# Patient Record
Sex: FEMALE | Race: WHITE
[De-identification: names, ages, dates, MRNs, and addresses within clinical notes are randomized per-mention and may not be internally consistent; named-entity substitution may affect disease eponyms.]

---

## 2017-03-09 ENCOUNTER — HOSPITAL ENCOUNTER (OUTPATIENT)
Dept: HOSPITAL 61 - PCVCCLINIC | Age: 82
Discharge: HOME | End: 2017-03-09
Attending: INTERNAL MEDICINE
Payer: COMMERCIAL

## 2017-03-09 DIAGNOSIS — I34.0: ICD-10-CM

## 2017-03-09 DIAGNOSIS — I07.1: ICD-10-CM

## 2017-03-09 DIAGNOSIS — I25.10: Primary | ICD-10-CM

## 2017-03-09 DIAGNOSIS — I10: ICD-10-CM

## 2017-03-09 DIAGNOSIS — E78.00: ICD-10-CM

## 2017-03-09 DIAGNOSIS — I25.5: ICD-10-CM

## 2017-03-09 DIAGNOSIS — I48.0: ICD-10-CM

## 2017-03-09 PROCEDURE — G0463 HOSPITAL OUTPT CLINIC VISIT: HCPCS

## 2017-03-09 PROCEDURE — 93005 ELECTROCARDIOGRAM TRACING: CPT

## 2017-03-28 ENCOUNTER — HOSPITAL ENCOUNTER (EMERGENCY)
Dept: HOSPITAL 35 - ER | Age: 82
Discharge: HOME | End: 2017-03-28
Payer: COMMERCIAL

## 2017-03-28 VITALS — WEIGHT: 170 LBS | BODY MASS INDEX: 34.27 KG/M2 | HEIGHT: 59 IN

## 2017-03-28 DIAGNOSIS — I10: ICD-10-CM

## 2017-03-28 DIAGNOSIS — R10.84: ICD-10-CM

## 2017-03-28 DIAGNOSIS — M41.9: ICD-10-CM

## 2017-03-28 DIAGNOSIS — R51: ICD-10-CM

## 2017-03-28 DIAGNOSIS — M54.30: ICD-10-CM

## 2017-03-28 DIAGNOSIS — R19.7: Primary | ICD-10-CM

## 2017-03-28 DIAGNOSIS — Z95.1: ICD-10-CM

## 2017-03-28 DIAGNOSIS — Z90.89: ICD-10-CM

## 2017-03-28 DIAGNOSIS — Z90.710: ICD-10-CM

## 2017-03-28 DIAGNOSIS — E78.5: ICD-10-CM

## 2017-03-28 DIAGNOSIS — Z95.0: ICD-10-CM

## 2017-03-28 DIAGNOSIS — Z86.12: ICD-10-CM

## 2017-03-28 DIAGNOSIS — M19.90: ICD-10-CM

## 2017-03-28 LAB
ANION GAP SERPL CALC-SCNC: 9 MMOL/L (ref 7–16)
ANISOCYTOSIS BLD QL SMEAR: (no result)
BASOPHILS NFR BLD AUTO: 3 % (ref 0–2)
BUN SERPL-MCNC: 13 MG/DL (ref 7–18)
BURR CELLS BLD QL SMEAR: (no result)
CALCIUM SERPL-MCNC: 9.1 MG/DL (ref 8.5–10.1)
CHLORIDE SERPL-SCNC: 105 MMOL/L (ref 98–107)
CO2 SERPL-SCNC: 26 MMOL/L (ref 21–32)
CREAT SERPL-MCNC: 0.7 MG/DL (ref 0.6–1.3)
EOSINOPHIL NFR BLD: 6 % (ref 0–3)
ERYTHROCYTE [DISTWIDTH] IN BLOOD BY AUTOMATED COUNT: 19.6 % (ref 10.5–14.5)
GLUCOSE SERPL-MCNC: 107 MG/DL (ref 70–99)
GRANULOCYTES NFR BLD MANUAL: 57 % (ref 36–66)
HCT VFR BLD CALC: 37.4 % (ref 37–47)
HGB BLD-MCNC: 12.7 GM/DL (ref 12–15)
LG PLATELETS BLD QL SMEAR: (no result)
LYMPHOCYTES NFR BLD AUTO: 17 % (ref 24–44)
MANUAL DIFFERENTIAL PERFORMED BLD QL: YES
MCH RBC QN AUTO: 31.8 PG (ref 26–34)
MCHC RBC AUTO-ENTMCNC: 33.9 G/DL (ref 28–37)
MCV RBC: 93.6 FL (ref 80–100)
MONOCYTES NFR BLD: 16 % (ref 1–8)
NEUTROPHILS # BLD: 3 THOU/UL (ref 1.4–8.2)
NEUTS BAND NFR BLD: 1 % (ref 0–8)
PLATELET # BLD: 268 THOU/UL (ref 150–400)
POIKILOCYTOSIS BLD QL SMEAR: SLIGHT
POTASSIUM SERPL-SCNC: 3.3 MMOL/L (ref 3.5–5.1)
RBC # BLD AUTO: 4 MIL/UL (ref 4.2–5)
SODIUM SERPL-SCNC: 140 MMOL/L (ref 136–145)
TOTAL CELL COUNT: 100
TROPONIN I SERPL-MCNC: < 0.04 NG/ML
WBC # BLD AUTO: 5.1 THOU/UL (ref 4–11)

## 2017-09-18 ENCOUNTER — HOSPITAL ENCOUNTER (OUTPATIENT)
Dept: HOSPITAL 61 - PCVCCLINIC | Age: 82
Discharge: HOME | End: 2017-09-18
Attending: INTERNAL MEDICINE
Payer: COMMERCIAL

## 2017-09-18 ENCOUNTER — HOSPITAL ENCOUNTER (OUTPATIENT)
Dept: HOSPITAL 61 - PCVCIMAG | Age: 82
Discharge: HOME | End: 2017-09-18
Attending: INTERNAL MEDICINE
Payer: COMMERCIAL

## 2017-09-18 DIAGNOSIS — E78.00: ICD-10-CM

## 2017-09-18 DIAGNOSIS — I48.0: ICD-10-CM

## 2017-09-18 DIAGNOSIS — I34.0: ICD-10-CM

## 2017-09-18 DIAGNOSIS — Z95.810: ICD-10-CM

## 2017-09-18 DIAGNOSIS — I07.1: Primary | ICD-10-CM

## 2017-09-18 DIAGNOSIS — I10: ICD-10-CM

## 2017-09-18 DIAGNOSIS — Z88.8: ICD-10-CM

## 2017-09-18 DIAGNOSIS — I25.810: Primary | ICD-10-CM

## 2017-09-18 DIAGNOSIS — Z79.899: ICD-10-CM

## 2017-09-18 DIAGNOSIS — Z95.1: ICD-10-CM

## 2017-09-18 DIAGNOSIS — I25.5: ICD-10-CM

## 2017-09-18 DIAGNOSIS — I36.1: ICD-10-CM

## 2017-09-18 DIAGNOSIS — M19.90: ICD-10-CM

## 2017-09-18 DIAGNOSIS — I25.810: ICD-10-CM

## 2017-09-18 PROCEDURE — 80061 LIPID PANEL: CPT

## 2017-09-18 PROCEDURE — 93283 PRGRMG EVAL IMPLANTABLE DFB: CPT

## 2017-09-18 PROCEDURE — G0463 HOSPITAL OUTPT CLINIC VISIT: HCPCS

## 2017-09-18 PROCEDURE — 93284 PRGRMG EVAL IMPLANTABLE DFB: CPT

## 2017-09-18 PROCEDURE — 93306 TTE W/DOPPLER COMPLETE: CPT

## 2017-09-18 NOTE — PCVCIMAG
--------------- APPROVED REPORT --------------





Study performed:  2017 13:42:43



EXAM: Comprehensive 2D, Doppler, and color-flow 

Echocardiogram

Patient Location: Echo lab

Status:  routine



BSA:         1.68

HR: 70 bpmBP:          120/72 mmHg

Rhythm: Pacemaker



Other Information 

Study Quality: Adequate

Technically limited study due to  body habitus.



Indications

Atrial Fibrillation

Pacemaker

CAD

CABG, ISCH CM



2D Dimensions

LVEF(%):  40.87 (&gt;50%)

IVSd:  9.77 (7-11mm)

LVDd:  41.07 mm

PWd:  9.76 (7-11mm)

LVDs:  32.98 (25-40mm)

Left Atrium:  43.70 (27-40mm)

Aortic Root:  34.60 mm

LV Single Plane 4CH:  48.72 %

LV Single Plane 2CH:  42.74 %Saunders's LVEF:  45.73 %

Biplane EF:  45.6 %



Volumes

Left Atrial Volume (Systole)

Single Plane 4CH:  116.63 mLSingle Plane 2CH:  93.99 mL

LA ESV Index:  62.00 mL/m2



Aortic Valve

AoV Peak Alcon.:  1.24 m/s

AO Peak Gr.:  6.14 mmHgLVOT Max P.98 mmHg

LVOT Max V:  0.86 m/s



Mitral Valve

E/A Ratio:  1.3

MV Decel. Time:  342.00 ms

MV E Max Alcon.:  0.50 m/s

MV A Alcon.:  0.39 m/s

IVRT:  103.81 ms



Pulmonary Valve

PV Peak Alcon.:  0.89 m/sPV Peak Gr.:  3.14 mmHg



Pulmonary Vein

P Vein S:    0.28 m/sP Vein A:  0.25 m/s

P Vein D:   0.47 m/sP Vein A Dur.:  128.0 msec

P Vein S/D Ratio:  0.60



Tricuspid Valve

TR Peak Alcon.:  2.76 m/s

TR Peak Gr.:  30.51 mmHg



Left Ventricle

The left ventricle is normal size. There is normal LV segmental wall 

motion. There is normal left ventricular wall thickness. Left 

ventricular systolic function is mildly decreased. LVEF is 45-50%. 

Grade II - pseudonormal filling dynamics.



Right Ventricle

The right ventricle is normal size. The right ventricular systolic 

function is normal. Pacemaker lead is present in the right ventricle. 



Atria

Left atrium is moderately-severely dilated. Right atrium is 

modeately- A pacemaker is seen in the right atrium consistent with 

history.severely dilated.



Aortic Valve

The aortic valve is normal in structure. No aortic regurgitation is 

present. There is no aortic valvular stenosis.



Mitral Valve

The mitral valve is normal in structure. There is no mitral valve 

regurgitation noted. No evidence of mitral valve stenosis.



Tricuspid Valve

The tricuspid valve is normal in structure. Moderate tricuspid 

regurgitation with PAP of 40 mmHg.



Pulmonic Valve

The pulmonary valve is normal in structure. There is no pulmonic 

valvular regurgitation.



Great Vessels

The aortic root is normal in size. IVC is normal in size and 

collapses with &gt;50% inspiration



Pericardium

There is no pericardial effusion.



&lt;Conclusion&gt;

The left ventricle is normal size.

Left ventricular systolic function is mildly decreased.

LVEF is 45-50%.

Grade II - pseudonormal filling dynamics.

The right ventricle is normal size.

Left atrium is moderately-severely dilated.

Right atrium is modeately-

A pacemaker is seen in the right atrium consistent with 

history.severely dilated.

There is no aortic valvular stenosis.

There is no mitral valve regurgitation noted.

Moderate tricuspid regurgitation with PAP of 40 mmHg.

There is no pericardial effusion.

## 2018-02-15 ENCOUNTER — HOSPITAL ENCOUNTER (OUTPATIENT)
Dept: HOSPITAL 61 - PCVCIMAG | Age: 83
Discharge: HOME | End: 2018-02-15
Attending: INTERNAL MEDICINE
Payer: COMMERCIAL

## 2018-02-15 DIAGNOSIS — I25.10: Primary | ICD-10-CM

## 2018-02-15 DIAGNOSIS — I48.91: ICD-10-CM

## 2018-02-15 DIAGNOSIS — I08.1: ICD-10-CM

## 2018-02-15 DIAGNOSIS — Z95.5: ICD-10-CM

## 2018-02-15 DIAGNOSIS — I42.9: ICD-10-CM

## 2018-02-15 DIAGNOSIS — I10: ICD-10-CM

## 2018-02-15 DIAGNOSIS — R07.9: ICD-10-CM

## 2018-02-15 DIAGNOSIS — E78.5: ICD-10-CM

## 2018-02-15 PROCEDURE — 93306 TTE W/DOPPLER COMPLETE: CPT

## 2018-11-14 ENCOUNTER — HOSPITAL ENCOUNTER (OUTPATIENT)
Dept: HOSPITAL 61 - PCVCCLINIC | Age: 83
Discharge: HOME | End: 2018-11-14
Attending: INTERNAL MEDICINE
Payer: COMMERCIAL

## 2018-11-14 DIAGNOSIS — Z48.812: Primary | ICD-10-CM

## 2018-11-14 DIAGNOSIS — Z95.810: ICD-10-CM

## 2018-11-14 PROCEDURE — 93283 PRGRMG EVAL IMPLANTABLE DFB: CPT

## 2019-02-22 ENCOUNTER — HOSPITAL ENCOUNTER (OUTPATIENT)
Dept: HOSPITAL 61 - PCVCCLINIC | Age: 84
Discharge: HOME | End: 2019-02-22
Attending: INTERNAL MEDICINE
Payer: COMMERCIAL

## 2019-02-22 DIAGNOSIS — E78.00: ICD-10-CM

## 2019-02-22 DIAGNOSIS — I25.5: ICD-10-CM

## 2019-02-22 DIAGNOSIS — Z79.899: ICD-10-CM

## 2019-02-22 DIAGNOSIS — I48.0: ICD-10-CM

## 2019-02-22 DIAGNOSIS — I34.0: ICD-10-CM

## 2019-02-22 DIAGNOSIS — I25.10: Primary | ICD-10-CM

## 2019-02-22 DIAGNOSIS — Z95.810: ICD-10-CM

## 2019-02-22 DIAGNOSIS — I10: ICD-10-CM

## 2019-02-22 DIAGNOSIS — I36.1: ICD-10-CM

## 2019-02-22 PROCEDURE — 93283 PRGRMG EVAL IMPLANTABLE DFB: CPT

## 2019-02-22 PROCEDURE — 80061 LIPID PANEL: CPT

## 2019-02-22 PROCEDURE — 36415 COLL VENOUS BLD VENIPUNCTURE: CPT

## 2019-02-22 PROCEDURE — G0463 HOSPITAL OUTPT CLINIC VISIT: HCPCS

## 2019-04-22 ENCOUNTER — HOSPITAL ENCOUNTER (OUTPATIENT)
Dept: HOSPITAL 35 - PAIN | Age: 84
End: 2019-04-22
Attending: ANESTHESIOLOGY
Payer: COMMERCIAL

## 2019-04-22 VITALS — BODY MASS INDEX: 38.47 KG/M2 | HEIGHT: 59.02 IN | WEIGHT: 190.8 LBS

## 2019-04-22 VITALS — SYSTOLIC BLOOD PRESSURE: 121 MMHG | DIASTOLIC BLOOD PRESSURE: 73 MMHG

## 2019-04-22 DIAGNOSIS — Z79.899: ICD-10-CM

## 2019-04-22 DIAGNOSIS — Z90.49: ICD-10-CM

## 2019-04-22 DIAGNOSIS — Z90.710: ICD-10-CM

## 2019-04-22 DIAGNOSIS — I10: ICD-10-CM

## 2019-04-22 DIAGNOSIS — M54.41: Primary | ICD-10-CM

## 2019-04-22 DIAGNOSIS — E03.9: ICD-10-CM

## 2019-04-22 DIAGNOSIS — E78.5: ICD-10-CM

## 2019-04-22 DIAGNOSIS — M54.42: ICD-10-CM

## 2019-05-02 ENCOUNTER — HOSPITAL ENCOUNTER (OUTPATIENT)
Dept: HOSPITAL 35 - PAIN | Age: 84
Discharge: HOME | End: 2019-05-02
Attending: ANESTHESIOLOGY
Payer: COMMERCIAL

## 2019-05-02 VITALS — SYSTOLIC BLOOD PRESSURE: 137 MMHG | DIASTOLIC BLOOD PRESSURE: 67 MMHG

## 2019-05-02 VITALS — WEIGHT: 180.4 LBS | BODY MASS INDEX: 36.37 KG/M2 | HEIGHT: 59.02 IN

## 2019-05-02 DIAGNOSIS — Z79.899: ICD-10-CM

## 2019-05-02 DIAGNOSIS — Z98.890: ICD-10-CM

## 2019-05-02 DIAGNOSIS — M54.16: Primary | ICD-10-CM

## 2020-03-04 ENCOUNTER — HOSPITAL ENCOUNTER (OUTPATIENT)
Dept: HOSPITAL 35 - SJCVC | Age: 85
End: 2020-03-04
Attending: INTERNAL MEDICINE
Payer: COMMERCIAL

## 2020-03-04 DIAGNOSIS — I07.1: ICD-10-CM

## 2020-03-04 DIAGNOSIS — I48.0: ICD-10-CM

## 2020-03-04 DIAGNOSIS — I25.10: Primary | ICD-10-CM

## 2020-03-04 DIAGNOSIS — Z95.810: ICD-10-CM

## 2020-03-04 DIAGNOSIS — I34.0: ICD-10-CM

## 2020-03-04 DIAGNOSIS — I25.5: ICD-10-CM

## 2020-03-04 DIAGNOSIS — I10: ICD-10-CM

## 2020-03-04 DIAGNOSIS — E78.00: ICD-10-CM

## 2020-07-07 ENCOUNTER — HOSPITAL ENCOUNTER (OUTPATIENT)
Dept: HOSPITAL 35 - SJCVCIMAG | Age: 85
End: 2020-07-07
Attending: INTERNAL MEDICINE
Payer: COMMERCIAL

## 2020-07-07 DIAGNOSIS — Z90.710: ICD-10-CM

## 2020-07-07 DIAGNOSIS — I25.2: ICD-10-CM

## 2020-07-07 DIAGNOSIS — I25.10: ICD-10-CM

## 2020-07-07 DIAGNOSIS — Z95.810: ICD-10-CM

## 2020-07-07 DIAGNOSIS — I25.5: ICD-10-CM

## 2020-07-07 DIAGNOSIS — I07.1: Primary | ICD-10-CM

## 2020-07-13 ENCOUNTER — HOSPITAL ENCOUNTER (OUTPATIENT)
Dept: HOSPITAL 35 - LAB | Age: 85
Discharge: HOME | End: 2020-07-13
Attending: INTERNAL MEDICINE
Payer: COMMERCIAL

## 2020-07-13 VITALS — HEIGHT: 59 IN | WEIGHT: 190.26 LBS | BODY MASS INDEX: 38.36 KG/M2

## 2020-07-13 VITALS — SYSTOLIC BLOOD PRESSURE: 143 MMHG | DIASTOLIC BLOOD PRESSURE: 73 MMHG

## 2020-07-13 DIAGNOSIS — E03.9: ICD-10-CM

## 2020-07-13 DIAGNOSIS — E78.00: ICD-10-CM

## 2020-07-13 DIAGNOSIS — I42.9: ICD-10-CM

## 2020-07-13 DIAGNOSIS — Z45.02: Primary | ICD-10-CM

## 2020-07-13 DIAGNOSIS — Z90.49: ICD-10-CM

## 2020-07-13 DIAGNOSIS — Z98.41: ICD-10-CM

## 2020-07-13 DIAGNOSIS — Z11.59: ICD-10-CM

## 2020-07-13 DIAGNOSIS — I50.9: ICD-10-CM

## 2020-07-13 DIAGNOSIS — Z98.42: ICD-10-CM

## 2020-07-13 DIAGNOSIS — I48.91: ICD-10-CM

## 2020-07-13 DIAGNOSIS — Z95.1: ICD-10-CM

## 2020-07-13 DIAGNOSIS — Z90.710: ICD-10-CM

## 2020-07-13 DIAGNOSIS — Z79.899: ICD-10-CM

## 2020-07-13 DIAGNOSIS — I25.10: ICD-10-CM

## 2020-07-13 DIAGNOSIS — M19.90: ICD-10-CM

## 2020-07-13 DIAGNOSIS — I25.2: ICD-10-CM

## 2020-07-13 DIAGNOSIS — Z98.890: ICD-10-CM

## 2020-07-13 DIAGNOSIS — Z79.01: ICD-10-CM

## 2020-07-13 DIAGNOSIS — I11.0: ICD-10-CM

## 2020-07-13 DIAGNOSIS — Z96.651: ICD-10-CM

## 2020-07-13 LAB
ALBUMIN SERPL-MCNC: 4.1 G/DL (ref 3.4–5)
ALT SERPL-CCNC: 18 U/L (ref 30–65)
ANION GAP SERPL CALC-SCNC: 10 MMOL/L (ref 7–16)
ANISOCYTOSIS BLD QL SMEAR: (no result)
APTT BLD: 25.5 SECONDS (ref 24.5–32.8)
AST SERPL-CCNC: 17 U/L (ref 15–37)
BASOPHILS NFR BLD AUTO: 2 % (ref 0–2)
BILIRUB SERPL-MCNC: 0.7 MG/DL (ref 0.2–1)
BUN SERPL-MCNC: 17 MG/DL (ref 7–18)
CALCIUM SERPL-MCNC: 9 MG/DL (ref 8.5–10.1)
CHLORIDE SERPL-SCNC: 99 MMOL/L (ref 98–107)
CO2 SERPL-SCNC: 28 MMOL/L (ref 21–32)
CREAT SERPL-MCNC: 0.8 MG/DL (ref 0.6–1)
EOSINOPHIL NFR BLD: 3.1 % (ref 0–3)
ERYTHROCYTE [DISTWIDTH] IN BLOOD BY AUTOMATED COUNT: 23.2 % (ref 10.5–14.5)
GLUCOSE SERPL-MCNC: 112 MG/DL (ref 74–106)
GRANULOCYTES NFR BLD MANUAL: 54.6 % (ref 36–66)
HCT VFR BLD CALC: 38.8 % (ref 37–47)
HGB BLD-MCNC: 13 GM/DL (ref 12–15)
INR PPP: 1
LYMPHOCYTES NFR BLD AUTO: 31.2 % (ref 24–44)
MCH RBC QN AUTO: 31.3 PG (ref 26–34)
MCHC RBC AUTO-ENTMCNC: 33.5 G/DL (ref 28–37)
MCV RBC: 93.5 FL (ref 80–100)
MICROCYTES: (no result)
MONOCYTES NFR BLD: 9.1 % (ref 1–8)
NEUTROPHILS # BLD: 2.5 THOU/UL (ref 1.4–8.2)
PLATELET # BLD EST: NORMAL 10*3/UL
PLATELET # BLD: 307 THOU/UL (ref 150–400)
POTASSIUM SERPL-SCNC: 4.2 MMOL/L (ref 3.5–5.1)
PROT SERPL-MCNC: 7.5 G/DL (ref 6.4–8.2)
PROTHROMBIN TIME: 10.3 SECONDS (ref 9.3–11.4)
RBC # BLD AUTO: 4.15 MIL/UL (ref 4.2–5)
SODIUM SERPL-SCNC: 137 MMOL/L (ref 136–145)
WBC # BLD AUTO: 4.5 THOU/UL (ref 4–11)

## 2020-07-13 PROCEDURE — 62110: CPT

## 2020-07-13 PROCEDURE — 62900: CPT

## 2020-07-13 PROCEDURE — 70005: CPT

## 2020-09-22 ENCOUNTER — HOSPITAL ENCOUNTER (OUTPATIENT)
Dept: HOSPITAL 35 - SJCVC | Age: 85
End: 2020-09-22
Attending: INTERNAL MEDICINE
Payer: COMMERCIAL

## 2020-09-22 DIAGNOSIS — Z79.899: ICD-10-CM

## 2020-09-22 DIAGNOSIS — I08.1: ICD-10-CM

## 2020-09-22 DIAGNOSIS — I10: ICD-10-CM

## 2020-09-22 DIAGNOSIS — Z95.810: ICD-10-CM

## 2020-09-22 DIAGNOSIS — E78.00: ICD-10-CM

## 2020-09-22 DIAGNOSIS — I25.5: ICD-10-CM

## 2020-09-22 DIAGNOSIS — I48.0: ICD-10-CM

## 2020-09-22 DIAGNOSIS — I25.10: Primary | ICD-10-CM

## 2021-10-28 ENCOUNTER — HOSPITAL ENCOUNTER (OUTPATIENT)
Dept: HOSPITAL 35 - SJCVC | Age: 86
End: 2021-10-28
Attending: INTERNAL MEDICINE
Payer: COMMERCIAL

## 2021-10-28 DIAGNOSIS — I25.5: ICD-10-CM

## 2021-10-28 DIAGNOSIS — I34.0: ICD-10-CM

## 2021-10-28 DIAGNOSIS — Z95.810: ICD-10-CM

## 2021-10-28 DIAGNOSIS — Z88.8: ICD-10-CM

## 2021-10-28 DIAGNOSIS — I25.10: Primary | ICD-10-CM

## 2021-10-28 DIAGNOSIS — I42.9: ICD-10-CM

## 2021-10-28 DIAGNOSIS — I10: ICD-10-CM

## 2021-10-28 DIAGNOSIS — E78.00: ICD-10-CM

## 2021-10-28 DIAGNOSIS — I65.23: ICD-10-CM

## 2021-10-28 DIAGNOSIS — D68.59: ICD-10-CM

## 2021-10-28 DIAGNOSIS — Z79.899: ICD-10-CM

## 2021-10-28 DIAGNOSIS — I36.1: ICD-10-CM
